# Patient Record
Sex: FEMALE | Race: AMERICAN INDIAN OR ALASKA NATIVE
[De-identification: names, ages, dates, MRNs, and addresses within clinical notes are randomized per-mention and may not be internally consistent; named-entity substitution may affect disease eponyms.]

---

## 2019-01-04 ENCOUNTER — HOSPITAL ENCOUNTER (EMERGENCY)
Dept: HOSPITAL 5 - ED | Age: 24
Discharge: LEFT BEFORE DISCHARGE | End: 2019-01-04
Payer: SELF-PAY

## 2019-12-07 ENCOUNTER — HOSPITAL ENCOUNTER (EMERGENCY)
Dept: HOSPITAL 5 - ED | Age: 24
Discharge: HOME | End: 2019-12-07
Payer: MEDICAID

## 2019-12-07 VITALS — DIASTOLIC BLOOD PRESSURE: 59 MMHG | SYSTOLIC BLOOD PRESSURE: 108 MMHG

## 2019-12-07 DIAGNOSIS — E86.0: ICD-10-CM

## 2019-12-07 DIAGNOSIS — Z79.899: ICD-10-CM

## 2019-12-07 DIAGNOSIS — O99.282: Primary | ICD-10-CM

## 2019-12-07 DIAGNOSIS — Z91.010: ICD-10-CM

## 2019-12-07 DIAGNOSIS — Z3A.14: ICD-10-CM

## 2019-12-07 DIAGNOSIS — G44.209: ICD-10-CM

## 2019-12-07 LAB
ALBUMIN SERPL-MCNC: 3.7 G/DL (ref 3.9–5)
ALT SERPL-CCNC: 12 UNITS/L (ref 7–56)
BASOPHILS # (AUTO): 0 K/MM3 (ref 0–0.1)
BASOPHILS NFR BLD AUTO: 0.6 % (ref 0–1.8)
BILIRUB UR QL STRIP: (no result)
BLOOD UR QL VISUAL: (no result)
BUN SERPL-MCNC: 9 MG/DL (ref 7–17)
BUN/CREAT SERPL: 23 %
CALCIUM SERPL-MCNC: 8.6 MG/DL (ref 8.4–10.2)
EOSINOPHIL # BLD AUTO: 0.1 K/MM3 (ref 0–0.4)
EOSINOPHIL NFR BLD AUTO: 1.8 % (ref 0–4.3)
HCT VFR BLD CALC: 32 % (ref 30.3–42.9)
HEMOLYSIS INDEX: 3
HGB BLD-MCNC: 11 GM/DL (ref 10.1–14.3)
LYMPHOCYTES # BLD AUTO: 2 K/MM3 (ref 1.2–5.4)
LYMPHOCYTES NFR BLD AUTO: 28.2 % (ref 13.4–35)
MCHC RBC AUTO-ENTMCNC: 35 % (ref 30–34)
MCV RBC AUTO: 87 FL (ref 79–97)
MONOCYTES # (AUTO): 0.6 K/MM3 (ref 0–0.8)
MONOCYTES % (AUTO): 9.2 % (ref 0–7.3)
MUCOUS THREADS #/AREA URNS HPF: (no result) /HPF
PH UR STRIP: 6 [PH] (ref 5–7)
PLATELET # BLD: 279 K/MM3 (ref 140–440)
RBC # BLD AUTO: 3.69 M/MM3 (ref 3.65–5.03)
RBC #/AREA URNS HPF: 24 /HPF (ref 0–6)
UROBILINOGEN UR-MCNC: 2 MG/DL (ref ?–2)
WBC #/AREA URNS HPF: 64 /HPF (ref 0–6)

## 2019-12-07 PROCEDURE — 85025 COMPLETE CBC W/AUTO DIFF WBC: CPT

## 2019-12-07 PROCEDURE — 84703 CHORIONIC GONADOTROPIN ASSAY: CPT

## 2019-12-07 PROCEDURE — 81001 URINALYSIS AUTO W/SCOPE: CPT

## 2019-12-07 PROCEDURE — 36415 COLL VENOUS BLD VENIPUNCTURE: CPT

## 2019-12-07 PROCEDURE — 80053 COMPREHEN METABOLIC PANEL: CPT

## 2019-12-07 PROCEDURE — 76805 OB US >/= 14 WKS SNGL FETUS: CPT

## 2019-12-07 PROCEDURE — 87086 URINE CULTURE/COLONY COUNT: CPT

## 2019-12-07 NOTE — EMERGENCY DEPARTMENT REPORT
ED Female  HPI





- General


Chief complaint: Abdominal Pain


Stated complaint: HEADACHE/ABDOMINAL PAIN (PREG)


Time Seen by Provider: 12/07/19 08:33


Source: patient


Mode of arrival: Ambulatory


Limitations: No Limitations





- History of Present Illness


Initial comments: 





Najma is a 25-year-old female who is currently 14 weeks 3 days pregnant who p

resents with headache no abdominal pain.  Symptoms have been present for the has

a day.  She has nonspecific lower nondescript abdominal pain.  Mild in severity.

 Moderate global headache after vomiting.  Denies fever.  Next chest pain.  

Denies vaginal bleeding.  She has not received any prenatal care at this time.


MD Complaint: other (abdominal pain lower)


-: Gradual


Severity: mild


Quality: dull, aching


Consistency: constant


Improves with: none


Worsens with: none


Are you Pregnant Now?: Yes


Associated Symptoms: headaches





- Related Data


                                Home Medications











 Medication  Instructions  Recorded  Confirmed  Last Taken


 


Prenatal Vit-Fe Fumar-FA [Prenatal 1 tab PO QDAY 06/01/16 08/12/16 1 Day Ago





Vitamin]    ~08/11/16








                                  Previous Rx's











 Medication  Instructions  Recorded  Last Taken  Type


 


HYDROcodone/APAP 5-325 [[place] 1 each PO Q6HR PRN #30 tablet 08/12/16 Unknown Rx





5/325]    


 


Ibuprofen [Motrin] 800 mg PO Q8HR PRN #60 tablet 08/12/16 Unknown Rx


 


Methocarbamol [Robaxin-750] 750 mg PO Q6HR PRN #20 tablet 01/05/19 Unknown Rx


 


Naproxen [Naprosyn] 500 mg PO BID #20 tablet 01/05/19 Unknown Rx


 


Promethazine [Phenergan] 25 mg PO Q6HR PRN #10 tab 12/07/19 Unknown Rx











                                    Allergies











Allergy/AdvReac Type Severity Reaction Status Date / Time


 


peanut Allergy Severe Shortness Verified 01/05/19 12:35





   of Breath  














ED Review of Systems


ROS: 


Stated complaint: HEADACHE/ABDOMINAL PAIN (PREG)


Other details as noted in HPI





Comment: All other systems reviewed and negative


Constitutional: denies: fever, malaise


Cardiovascular: denies: chest pain


Gastrointestinal: abdominal pain, nausea, vomiting


Neurological: headache





ED Past Medical Hx





- Past Medical History


Previous Medical History?: No


Hx Hypertension: No


Hx Congestive Heart Failure: No


Hx Diabetes: No


Hx Deep Vein Thrombosis: No


Hx Renal Disease: No


Hx Sickle Cell Disease: No


Hx Seizures: No


Hx Asthma: No


Hx COPD: No


Hx HIV: No





- Social History


Smoking Status: Never Smoker


Substance Use Type: None





- Medications


Home Medications: 


                                Home Medications











 Medication  Instructions  Recorded  Confirmed  Last Taken  Type


 


Prenatal Vit-Fe Fumar-FA [Prenatal 1 tab PO QDAY 06/01/16 08/12/16 1 Day Ago 

History





Vitamin]    ~08/11/16 


 


HYDROcodone/APAP 5-325 [[place] 1 each PO Q6HR PRN #30 tablet 08/12/16  Unknown Rx





5/325]     


 


Ibuprofen [Motrin] 800 mg PO Q8HR PRN #60 tablet 08/12/16  Unknown Rx


 


Methocarbamol [Robaxin-750] 750 mg PO Q6HR PRN #20 tablet 01/05/19  Unknown Rx


 


Naproxen [Naprosyn] 500 mg PO BID #20 tablet 01/05/19  Unknown Rx


 


Promethazine [Phenergan] 25 mg PO Q6HR PRN #10 tab 12/07/19  Unknown Rx














ED Physical Exam





- General


Limitations: No Limitations


General appearance: alert, in no apparent distress





- Head


Head exam: Present: atraumatic, normocephalic





- Eye


Eye exam: Present: normal appearance





- ENT


ENT exam: Present: mucous membranes moist





- Neck


Neck exam: Present: normal inspection, full ROM





- Respiratory


Respiratory exam: Present: normal lung sounds bilaterally.  Absent: respiratory 

distress, wheezes, rales, rhonchi





- Cardiovascular


Cardiovascular Exam: Present: regular rate, normal rhythm, normal heart sounds. 

 Absent: systolic murmur, diastolic murmur, rubs, gallop





- GI/Abdominal


GI/Abdominal exam: Present: soft, normal bowel sounds.  Absent: distended, 

tenderness, guarding, rebound





- Extremities Exam


Extremities exam: Present: normal inspection





- Neurological Exam


Neurological exam: Present: alert, oriented X3





- Psychiatric


Psychiatric exam: Present: normal affect, normal mood





- Skin


Skin exam: Present: warm, dry, intact, normal color.  Absent: rash





ED Course


                                   Vital Signs











  12/07/19





  07:36


 


Temperature 98.9 F


 


Pulse Rate 75


 


Respiratory 18





Rate 


 


Blood Pressure 108/59


 


O2 Sat by Pulse 100





Oximetry 














ED Medical Decision Making





- Lab Data


Result diagrams: 


                                 12/07/19 07:46





                                 12/07/19 07:46








                         Laboratory Results - last 24 hr











  12/07/19 12/07/19 12/07/19





  07:45 07:46 07:46


 


WBC   7.0 


 


RBC   3.69 


 


Hgb   11.0 


 


Hct   32.0 


 


MCV   87 


 


MCH   30 


 


MCHC   35 H 


 


RDW   13.6 


 


Plt Count   279 


 


Lymph % (Auto)   28.2 


 


Mono % (Auto)   9.2 H 


 


Eos % (Auto)   1.8 


 


Baso % (Auto)   0.6 


 


Lymph #   2.0 


 


Mono #   0.6 


 


Eos #   0.1 


 


Baso #   0.0 


 


Seg Neutrophils %   60.2 


 


Seg Neutrophils #   4.2 


 


Sodium    138


 


Potassium    3.8


 


Chloride    103.3


 


Carbon Dioxide    19 L


 


Anion Gap    20


 


BUN    9


 


Creatinine    0.4 L


 


Estimated GFR    > 60


 


BUN/Creatinine Ratio    23


 


Glucose    100


 


Calcium    8.6


 


Total Bilirubin    0.20


 


AST    13


 


ALT    12


 


Alkaline Phosphatase    45


 


Total Protein    6.8


 


Albumin    3.7 L


 


Albumin/Globulin Ratio    1.2


 


Urine Color  Yellow  


 


Urine Turbidity  Cloudy  


 


Urine pH  6.0  


 


Ur Specific Gravity  1.040 H  


 


Urine Protein  100 mg/dl  


 


Urine Glucose (UA)  Neg  


 


Urine Ketones  Neg  


 


Urine Blood  Neg  


 


Urine Nitrite  Neg  


 


Urine Bilirubin  Neg  


 


Urine Urobilinogen  2.0  


 


Ur Leukocyte Esterase  Lg  


 


Urine WBC (Auto)  64.0 H  


 


Urine RBC (Auto)  24.0  


 


U Epithel Cells (Auto)  23.0 H  


 


Urine Mucus  3+  














- Radiology Data





Ultrasound OB: Single viable intrauterine fetus intact fetal heart rate 

estimated gestational age 14 weeks 6 days corresponds to dates





- Medical Decision Making





Najma is a healthy 24-year-old female who presents with headache vomiting  

abdominal pain.  





1. Headache appears to be tension related to morning sickness and dehydration.  

Treated with IV hydration and Tylenol.  Headache did improve with treatment.  





2.  Lower abdominal pain, ectopic pregnancy ruled out with ultrasound.  No 

tenderness or localized pain to indicate appendicitis.  I do not suspect ovarian

 torsion or biliary colic.





3.  Lack of prenatal care.  Najma has obtained health insurance.  She is now 

able to follow up with an obstetrician.  I referred her to obstetrician on-call.





Prescribed promethazine.








Critical care attestation.: 


If time is entered above; I have spent that time in minutes in the direct care 

of this critically ill patient, excluding procedure time.








ED Disposition


Clinical Impression: 


 Tension headache, Dehydration, Second trimester pregnancy





Disposition: DC-01 TO HOME OR SELFCARE


Is pt being admited?: No


Does the pt Need Aspirin: No


Condition: Stable


Instructions:  Abdominal Pain in Pregnancy  (ED)


Prescriptions: 


Promethazine [Phenergan] 25 mg PO Q6HR PRN #10 tab


 PRN Reason: Nausea


Referrals: 


JAN SOLORZANO MD [Staff Physician] - 3-5 Days

## 2019-12-07 NOTE — ULTRASOUND REPORT
OB Ultrasound



HISTORY: pelvic pain.  Acute cramping



TECHNIQUE:  Grayscale and color Doppler  imaging performed.



COMPARISON:  None



FINDINGS: There is a single viable intrauterine gestation with variable positioning of the fetus. Hea
rt rate is 159 bpm. Cervix is 4 cm in length. Overall EGA of the fetus is estimated as 14 weeks and 6
 days by evaluating biparietal diameter, head circumference, abdominal circumference, and femoral sanjiv
gth. There are amniotic fluid pockets greater than 2 cm. Placenta is positioned anteriorly at the fun
dus.



IMPRESSION: Single viable intrauterine gestation as outlined above.



Signer Name: Ihsan Cason MD 

Signed: 12/7/2019 10:01 AM

 Workstation Name: [id number]

## 2021-11-10 ENCOUNTER — HOSPITAL ENCOUNTER (EMERGENCY)
Dept: HOSPITAL 5 - ED | Age: 26
Discharge: HOME | End: 2021-11-10
Payer: MEDICAID

## 2021-11-10 VITALS — SYSTOLIC BLOOD PRESSURE: 122 MMHG | DIASTOLIC BLOOD PRESSURE: 79 MMHG

## 2021-11-10 DIAGNOSIS — N83.201: Primary | ICD-10-CM

## 2021-11-10 DIAGNOSIS — N93.8: ICD-10-CM

## 2021-11-10 DIAGNOSIS — Z91.010: ICD-10-CM

## 2021-11-10 LAB
BASOPHILS # (AUTO): 0.1 K/MM3 (ref 0–0.1)
BASOPHILS NFR BLD AUTO: 0.9 % (ref 0–1.8)
BUN SERPL-MCNC: 9 MG/DL (ref 7–17)
BUN/CREAT SERPL: 18 %
CALCIUM SERPL-MCNC: 8.6 MG/DL (ref 8.4–10.2)
EOSINOPHIL # BLD AUTO: 0.1 K/MM3 (ref 0–0.4)
EOSINOPHIL NFR BLD AUTO: 1.3 % (ref 0–4.3)
HCT VFR BLD CALC: 31.8 % (ref 30.3–42.9)
HEMOLYSIS INDEX: 9
HGB BLD-MCNC: 10.5 GM/DL (ref 10.1–14.3)
LYMPHOCYTES # BLD AUTO: 2.7 K/MM3 (ref 1.2–5.4)
LYMPHOCYTES NFR BLD AUTO: 40.7 % (ref 13.4–35)
MCHC RBC AUTO-ENTMCNC: 33 % (ref 30–34)
MCV RBC AUTO: 88 FL (ref 79–97)
MONOCYTES # (AUTO): 0.6 K/MM3 (ref 0–0.8)
MONOCYTES % (AUTO): 8.9 % (ref 0–7.3)
PLATELET # BLD: 359 K/MM3 (ref 140–440)
RBC # BLD AUTO: 3.63 M/MM3 (ref 3.65–5.03)

## 2021-11-10 PROCEDURE — 84702 CHORIONIC GONADOTROPIN TEST: CPT

## 2021-11-10 PROCEDURE — 86900 BLOOD TYPING SEROLOGIC ABO: CPT

## 2021-11-10 PROCEDURE — 80048 BASIC METABOLIC PNL TOTAL CA: CPT

## 2021-11-10 PROCEDURE — 86901 BLOOD TYPING SEROLOGIC RH(D): CPT

## 2021-11-10 PROCEDURE — 36415 COLL VENOUS BLD VENIPUNCTURE: CPT

## 2021-11-10 PROCEDURE — 76801 OB US < 14 WKS SINGLE FETUS: CPT

## 2021-11-10 PROCEDURE — 85025 COMPLETE CBC W/AUTO DIFF WBC: CPT

## 2021-11-10 NOTE — ULTRASOUND REPORT
ULTRASOUND OBSTETRIC 



INDICATION / CLINICAL INFORMATION: pregnant, vaginal bleeding. Serum hCG level = 73.8

Clinical Gestational Age (GA) in weeks, days: 3, 6 



TECHNIQUE: Transabdominal.



COMPARISON: None available.



FINDINGS:

UTERUS: No intrauterine pregnancy identified. Uterus is normal size measuring 9.7 x 5.0 x 4.9 cm with
 endometrial thickness of 0.6 cm. 



ADNEXA: 1.8 cm physiologic cyst of the right ovary. Normal color Doppler flow. Left ovary was not rafael
ntified. No left adnexal cyst or mass.

FREE FLUID: None.



ADDITIONAL FINDINGS: None.



IMPRESSION:

1. No intrauterine pregnancy.



Signer Name: TITO Marion MD 

Signed: 11/10/2021 5:39 AM

Workstation Name: [id number]

## 2021-11-10 NOTE — EMERGENCY DEPARTMENT REPORT
ED Female  HPI





- General


Chief complaint: Vaginal Bleeding


Stated complaint: 3WKS PREGNANT/BLEEDING


Time Seen by Provider: 11/10/21 03:13


Source: patient


Mode of arrival: Ambulatory


Limitations: No Limitations





- History of Present Illness


Initial comments: 


Patient is a G3,  who presents for vaginal bleeding and spotting x2 days. 

Rates bleeding is scant. 1-2 pads per day. There is no fevers no chills no 

abdominal pain no backache. Last menstrual cycle 3 weeks ago. States positive 

home pregnancy test x3 at home. Patient has not seen OB/GYN. Patient denies 

other exacerbating or relieving factors





MD Complaint: vaginal bleeding





- Related Data


                                Home Medications











 Medication  Instructions  Recorded  Confirmed  Last Taken


 


Prenatal Vit-Fe Fumar-FA [Prenatal 1 tab PO QDAY 16 1 Day Ago





Vitamin]    ~16








                                  Previous Rx's











 Medication  Instructions  Recorded  Last Taken  Type


 


HYDROcodone/APAP 5-325 [[place] 1 each PO Q6HR PRN #30 tablet 16 Unknown Rx





5/325]    


 


Ibuprofen [Motrin] 800 mg PO Q8HR PRN #60 tablet 16 Unknown Rx


 


Naproxen [Naprosyn] 500 mg PO BID #20 tablet 19 1 Day Ago Rx





   ~20 


 


methocarbamoL [Robaxin-750] 750 mg PO Q6HR PRN #20 tablet 19 Unknown Rx


 


Promethazine [Phenergan] 25 mg PO Q6HR PRN #10 tab 19 1 Day Ago Rx





   ~20 





   1 


 


Miconazole/Cleanser 17 On Wipe 1 each VG QHS 7 Days #1 kit 20 1 Day Ago Rx





[Monistat 7 Combination Pack]   ~20 





   325 


 


Ferrous Sulfate [Feosol 325 MG tab] 325 mg PO BID #60 tablet 20 Unknown Rx


 


Ibuprofen [Motrin] 600 mg PO Q6H PRN #60 tablet 20 Unknown Rx











                                    Allergies











Allergy/AdvReac Type Severity Reaction Status Date / Time


 


peanut Allergy Severe Shortness Verified 19 12:35





   of Breath  














ED Review of Systems


ROS: 


Stated complaint: 3WKS PREGNANT/BLEEDING


Other details as noted in HPI





Constitutional: denies: chills, fever


Eyes: denies: eye pain, eye discharge, vision change


ENT: denies: ear pain, throat pain


Respiratory: denies: cough, shortness of breath, wheezing


Cardiovascular: denies: chest pain, palpitations


Endocrine: no symptoms reported


Gastrointestinal: denies: abdominal pain, nausea, vomiting, diarrhea


Genitourinary: abnormal menses.  denies: urgency, dysuria, frequency, hematuria,

 discharge


Musculoskeletal: denies: back pain, joint swelling, arthralgia


Skin: denies: rash, lesions


Neurological: denies: headache, weakness, paresthesias


Psychiatric: denies: anxiety, depression


Hematological/Lymphatic: denies: easy bleeding, easy bruising





ED Past Medical Hx





- Past Medical History


Hx Hypertension: No


Hx Congestive Heart Failure: No


Hx Diabetes: No


Hx Deep Vein Thrombosis: No


Hx Renal Disease: No


Hx Sickle Cell Disease: No


Hx Seizures: No


Hx Asthma: No


Hx COPD: No


Hx HIV: No





- Surgical History


Past Surgical History?: No





- Social History


Smoking Status: Never Smoker





- Medications


Home Medications: 


                                Home Medications











 Medication  Instructions  Recorded  Confirmed  Last Taken  Type


 


Prenatal Vit-Fe Fumar-FA [Prenatal 1 tab PO QDAY 16 1 Day Ago 

History





Vitamin]    ~16 


 


HYDROcodone/APAP 5-325 [[place] 1 each PO Q6HR PRN #30 tablet 16 

Unknown Rx





5/325]     


 


Ibuprofen [Motrin] 800 mg PO Q8HR PRN #60 tablet 16 Unknown Rx


 


Naproxen [Naprosyn] 500 mg PO BID #20 tablet 19 1 Day Ago Rx





    ~20 


 


methocarbamoL [Robaxin-750] 750 mg PO Q6HR PRN #20 tablet 19 

Unknown Rx


 


Promethazine [Phenergan] 25 mg PO Q6HR PRN #10 tab 19 1 Day Ago 

Rx





    ~20 





    1 


 


Miconazole/Cleanser 17 On Wipe 1 each VG QHS 7 Days #1 kit 20 1 

Day Ago Rx





[Monistat 7 Combination Pack]    ~20 





    325 


 


Ferrous Sulfate [Feosol 325 MG tab] 325 mg PO BID #60 tablet 20  Unknown 

Rx


 


Ibuprofen [Motrin] 600 mg PO Q6H PRN #60 tablet 20  Unknown Rx














ED Physical Exam





- General


Limitations: No Limitations


General appearance: alert, in no apparent distress





- Head


Head exam: Present: atraumatic, normocephalic





- Eye


Eye exam: Present: normal appearance, EOMI


Pupils: Present: normal accommodation





- ENT


ENT exam: Present: mucous membranes moist





- Neck


Neck exam: Present: normal inspection





- Respiratory


Respiratory exam: Present: normal lung sounds bilaterally.  Absent: respiratory 

distress, wheezes





- Cardiovascular


Cardiovascular Exam: Present: regular rate, normal rhythm, normal heart sounds. 

 Absent: systolic murmur, diastolic murmur, rubs, gallop





- GI/Abdominal


GI/Abdominal exam: Present: soft, normal bowel sounds.  Absent: distended, 

tenderness, guarding, rebound, rigid, bruit, hernia





- Rectal


Rectal exam: Present: deferred





- 


External exam: Present: other (defered by patient )





- Extremities Exam


Extremities exam: Present: normal inspection, full ROM, normal capillary refill.

  Absent: tenderness





- Back Exam


Back exam: Present: normal inspection, full ROM.  Absent: CVA tenderness (R), 

CVA tenderness (L)





- Neurological Exam


Neurological exam: Present: alert, oriented X3, CN II-XII intact, normal gait





- Psychiatric


Psychiatric exam: Present: normal affect, normal mood





- Skin


Skin exam: Present: warm, dry, intact, normal color.  Absent: rash





ED Course


                                   Vital Signs











  11/10/21





  01:48


 


Temperature 98.5 F


 


Pulse Rate 65


 


Respiratory 17





Rate 


 


Blood Pressure 122/79





[Right] 


 


O2 Sat by Pulse 100





Oximetry 














ED Medical Decision Making





- Lab Data


Result diagrams: 


                                 11/10/21 03:17





                                 11/10/21 03:17








Labs











  11/10/21 11/10/21 11/10/21





  03:17 03:17 03:17


 


WBC  6.7  


 


RBC  3.63 L  


 


Hgb  10.5  


 


Hct  31.8  


 


MCV  88  


 


MCH  29  


 


MCHC  33  


 


RDW  13.7  


 


Plt Count  359  


 


Lymph % (Auto)  40.7 H  


 


Mono % (Auto)  8.9 H  


 


Eos % (Auto)  1.3  


 


Baso % (Auto)  0.9  


 


Lymph # (Auto)  2.7  


 


Mono # (Auto)  0.6  


 


Eos # (Auto)  0.1  


 


Baso # (Auto)  0.1  


 


Seg Neutrophils %  48.2  


 


Seg Neutrophils #  3.2  


 


Sodium   138 


 


Potassium   3.9 


 


Chloride   105.8 


 


Carbon Dioxide   22 


 


Anion Gap   14 


 


BUN   9 


 


Creatinine   0.5 L 


 


Estimated GFR   > 60 


 


BUN/Creatinine Ratio   18 


 


Glucose   99 


 


Calcium   8.6 


 


HCG, Quant    73.81 H


 


Blood Type   


 


Ord Rhogam Gestat Weeks   














  11/10/21





  03:17


 


WBC 


 


RBC 


 


Hgb 


 


Hct 


 


MCV 


 


MCH 


 


MCHC 


 


RDW 


 


Plt Count 


 


Lymph % (Auto) 


 


Mono % (Auto) 


 


Eos % (Auto) 


 


Baso % (Auto) 


 


Lymph # (Auto) 


 


Mono # (Auto) 


 


Eos # (Auto) 


 


Baso # (Auto) 


 


Seg Neutrophils % 


 


Seg Neutrophils # 


 


Sodium 


 


Potassium 


 


Chloride 


 


Carbon Dioxide 


 


Anion Gap 


 


BUN 


 


Creatinine 


 


Estimated GFR 


 


BUN/Creatinine Ratio 


 


Glucose 


 


Calcium 


 


HCG, Quant 


 


Blood Type  B POSITIVE


 


Ord Rhogam Gestat Weeks  Rh pos














- Radiology Data


Radiology results: report reviewed, image reviewed


 


ULTRASOUND OBSTETRIC   


 


 INDICATION / CLINICAL INFORMATION: pregnant, vaginal bleeding. Serum hCG level 

= 73.8  


 Clinical Gestational Age (GA) in weeks, days: 3, 6   


 


 TECHNIQUE: Transabdominal.  


 


 COMPARISON: None available.  


 


 FINDINGS:  


 UTERUS: No intrauterine pregnancy identified. Uterus is normal size measuring 

9.7 x 5.0 x 4.9 cm 


with endometrial thickness of 0.6 cm.   


 


 ADNEXA: 1.8 cm physiologic cyst of the right ovary. Normal color Doppler flow. 

Left ovary was not 


identified. No left adnexal cyst or mass.  


 FREE FLUID: None.  


 


 ADDITIONAL FINDINGS: None.  


 


 IMPRESSION:  


 1. No intrauterine pregnancy.  


 


 Signer Name: TITO Marion MD   


 Signed: 11/10/2021 5:39 AM  


 Workstation Name: [id number]   


 


 








- Medical Decision Making


Ultrasound right ovarian cyst, no intrauterine pregnancy noted. H/H normal.  

Discussed findings with patient.  Patient will follow-up with OB/GYN in 2 to 3 

days.  Labs noted patient is blood type B+ , RH+, patient will be DC'd in stable

 condition at this time.





Critical care attestation.: 


If time is entered above; I have spent that time in minutes in the direct care 

of this critically ill patient, excluding procedure time.








ED Disposition


Clinical Impression: 


 Ovarian cyst, right, Abnormal uterine bleeding (AUB)





Disposition: 01 HOME / SELF CARE / HOMELESS


Is pt being admited?: No


Does the pt Need Aspirin: No


Condition: Stable


Instructions:  Ovarian Cyst, Easy-to-Read, Abnormal Uterine Bleeding


Additional Instructions: 


Take over-the-counter NSAIDs as needed for pain, follow-up with your OB/GYN in 2

 to 3 days.  Return to emergency department should symptoms worsen.


Referrals: 


DARIEL PORTER JR, MD [Staff Physician] - 3-5 Days


Forms:  Work/School Release Form(ED)


Time of Disposition: 06:38

## 2022-06-03 ENCOUNTER — HOSPITAL ENCOUNTER (EMERGENCY)
Dept: HOSPITAL 5 - ED | Age: 27
Discharge: LEFT BEFORE DISCHARGE | End: 2022-06-03
Payer: MEDICAID

## 2022-06-03 DIAGNOSIS — Z53.21: ICD-10-CM

## 2022-06-03 DIAGNOSIS — R51.9: Primary | ICD-10-CM
